# Patient Record
Sex: FEMALE | Race: WHITE
[De-identification: names, ages, dates, MRNs, and addresses within clinical notes are randomized per-mention and may not be internally consistent; named-entity substitution may affect disease eponyms.]

---

## 2019-12-12 ENCOUNTER — HOSPITAL ENCOUNTER (EMERGENCY)
Dept: HOSPITAL 25 - ED | Age: 34
Discharge: HOME | End: 2019-12-12
Payer: COMMERCIAL

## 2019-12-12 VITALS — SYSTOLIC BLOOD PRESSURE: 141 MMHG | DIASTOLIC BLOOD PRESSURE: 79 MMHG

## 2019-12-12 DIAGNOSIS — Z88.0: ICD-10-CM

## 2019-12-12 DIAGNOSIS — Z87.891: ICD-10-CM

## 2019-12-12 DIAGNOSIS — L50.9: Primary | ICD-10-CM

## 2019-12-12 DIAGNOSIS — Z90.49: ICD-10-CM

## 2019-12-12 PROCEDURE — 96372 THER/PROPH/DIAG INJ SC/IM: CPT

## 2019-12-12 PROCEDURE — 96374 THER/PROPH/DIAG INJ IV PUSH: CPT

## 2019-12-12 PROCEDURE — 99283 EMERGENCY DEPT VISIT LOW MDM: CPT

## 2019-12-12 NOTE — ED
Skin Complaint





- HPI Summary


HPI Summary: 





This pt is a 33 Y/O F presenting to [facility] accompanied by her  with a CC 

of hives that started in 12/11/19 in the morning without improvements and are 

currently rated a 02/10 in severity. She states that the hives spread from her 

legs and currently cover her entire body. She states that her shoulders have 

been itchy and her hives were originally itchy as well but she states that she 

became SOB and has had decreased itchiness. She states that she was placed on 

clindamycin for 4 times a day and was taking it for over a week. She states 

that she has been taking prednisone which was prescribed by her PCP. She states 

that she has been having a nonproductive cough and states that she feels 

phlegmy. She denies any CP, headaches, N/V, and fevers. She has no known 

aggravating or alleviating factors. She has a pertinent PMHx of allergies to 

mushrooms, penicillin, and ALL brand laundry detergent. She states that she 

breaks out in hives.





- History of Current Complaint


Chief Complaint: EDAllergicReaction


Time Seen by Provider: 12/12/19 20:48


Stated Complaint: ALLERGIC REACTION/RESP DISTRESS PER PT


Hx Obtained From: Patient


Onset/Duration: Started Days Ago - 1


Skin Exposure Onset/Duration: Days Ago - 1


Timing: Constant


Onset Severity: Mild


Current Severity: Mild


Pain Intensity: 2


Pain Scale Used: 0-10 Numeric


Skin Location: Diffuse, Face, Chest, Arm, Leg, Other: - trunk


Character: Hives


Aggravating Symptom(s): Nothing


Alleviating Symptom(s): Nothing


Associated Signs & Symptoms: Negative - CP, headaches, N/V, and fevers, 

Difficulty Breathing





- Allergy/Home Medications


Allergies/Adverse Reactions: 


 Allergies











Allergy/AdvReac Type Severity Reaction Status Date / Time


 


mushroom Allergy  Anaphylatic Verified 12/12/19 20:07





   Shock  


 


Penicillins Allergy  Hives Verified 12/12/19 20:07


 


ALL brand laundry detergent Allergy  Hives Uncoded 12/12/19 20:08











Home Medications: 


 Home Medications





Norethindrone (NF) [Julianna (NF)] 0.35 mg PO DAILY 12/12/19 [History Confirmed 

12/12/19]











PMH/Surg Hx/FS Hx/Imm Hx


Previously Healthy: Yes


Endocrine/Hematology History: 


   Denies: Hx Diabetes


Cardiovascular History: 


   Denies: Hx Hypertension


Respiratory History: 


   Denies: Hx Asthma


Sensory History: Reports: Hx Contacts or Glasses


Opthamlomology History: Reports: Hx Contacts or Glasses


EENT History: Reports: Other - Mushroom, Penicillins, and ALL brand laundry 

detergent (hives)





- Cancer History


Hx Chemotherapy: No


Hx Radiation Therapy: No





- Surgical History


Surgical History: Yes


Surgery Procedure, Year, and Place: Gallbladder removal, 2007





- Immunization History


Immunizations Up to Date: Yes


Infectious Disease History: No


Infectious Disease History: 


   Denies: Traveled Outside the [place] in Last 30 Days





- Family History


Known Family History: Positive: Hypertension - Father , Diabetes - Paternal 

grndmother , Other - Maternal Grandmother: [place] 





- Social History


Occupation: Unemployed


Lives: With Family


Alcohol Use: None


Hx Substance Use: Yes


Substance Use Type: Reports: Marijuana


Substance Use Comment - Amount & Last Used: States that she quit 7 years ago 


Hx Tobacco Use: Yes


Smoking Status (MU): Former Smoker


Type: Cigarettes


Amount Used/How Often: <PPD


Length of Time of Smoking/Using Tobacco: Quit in 2012





Review of Systems


Negative: Fever


Negative: Chest Pain


Positive: Shortness Of Breath


Negative: Vomiting, Nausea


Positive: Rash - States diffuse hives 


Negative: Headache


All Other Systems Reviewed And Are Negative: Yes





Physical Exam





- Summary


Physical Exam Summary: 





General: Well-developed, morbidly obese female. No acute distress.


HEENT: Normocephalic, Atraumatic. 


              Eyes: Conjuctiva normal, PERRL.


              Oropharynx: Clear, mucous membranes moist, (-) exudates. 


Neck: Soft, FROM, (-) lymphadenopathy, (-) thyromegaly, (-) JVD.


Cardiovascular: Normal sinus rhythm, (-) murmur.


Lungs: Clear to auscultation bilaterally (-) wheezes, (-) rales, (-) rhonchi.


Abdomen: Soft, non-tender, non-distended, (-) organomegaly, normal bowel sounds.


Back: (-) CVA tenderness


Extremities: No edema.


Skin: Warm, diffuse hives on her face, arms, trunk, back and legs.


Neuro: Alert and oriented x3, no focal deficits.


Psychiatric: Mood normal, affect normal.





Triage Information Reviewed: Yes


Vital Signs On Initial Exam: 


 Initial Vitals











Temp Pulse Resp BP Pulse Ox


 


 98.0 F   110   20   148/91   100 


 


 12/12/19 20:03  12/12/19 20:03  12/12/19 20:03  12/12/19 20:03  12/12/19 20:03











Vital Signs Reviewed: Yes





Procedures





- Sedation


Patient Received Moderate/Deep Sedation with Procedure: No





Diagnostics





- Vital Signs


 Vital Signs











  Temp Pulse Resp BP Pulse Ox


 


 12/12/19 20:03  98.0 F  110  20  148/91  100














- Laboratory


Lab Statement: Any lab studies that have been ordered have been reviewed, and 

results considered in the medical decision making process.





Course/Dx





- Course


Course Of Treatment: 34-year-old female presents with hives.  Patient has 

recently been on indomethacin.  Has stopped that.  Is being treated with 

Benadryl and prednisone, 20 mg daily.  Today's having mild lightheadedness and 

shortness of breath after taking medicine. physical exam demonstrates hives. 

patient given benadryl, pepcid, solumedrol with improvement of symptoms. 

discharged to home. avoid clindamycin. follow up with PCP. follow up sooner for 

any worsening symptoms. prednisone increased.





- Diagnoses


Provider Diagnoses: 


 Hives








Discharge ED





- Sign-Out/Discharge


Documenting (check all that apply): Patient Departure - discharge 





- Discharge Plan


Condition: Stable


Disposition: HOME


Prescriptions: 


predniSONE [Prednisone 20 MG TAB] 20 mg PO BID 5 Days #10 tablet


Patient Education Materials:  Urticaria (ED)


Referrals: 


[facility] [Outside] - 2 Days


Additional Instructions: 


Please follow up with your primary care provider in 1-3 days and return to the 

emergency department for any new or worsening symptoms. 





Your dose of Prednisone has been increased. Please avoid Clindamycin until you 

have been allergy tested.  





- Billing Disposition and Condition


Condition: STABLE


Disposition: Home





- Attestation Statements


Document Initiated by Barbaraiblinda: Yes


Documenting Scribe: Maury Dhaliwal


Provider For Whom Fernando is Documenting (Include Credential): Chelsea Waller MD


Scribe Attestation: 


Maury ALCANTARA, scribed for Chelsea Waller MD on 12/16/19 at 2152. 


Scribe Documentation Reviewed: Yes


Provider Attestation: 


The documentation as recorded by the Maury lucas accurately reflects 

the service I personally performed and the decisions made by me, Chelsea Waller MD


Status of Scribe Document: Viewed